# Patient Record
Sex: MALE | Race: WHITE
[De-identification: names, ages, dates, MRNs, and addresses within clinical notes are randomized per-mention and may not be internally consistent; named-entity substitution may affect disease eponyms.]

---

## 2020-03-18 ENCOUNTER — HOSPITAL ENCOUNTER (EMERGENCY)
Dept: HOSPITAL 33 - ED | Age: 70
Discharge: HOME | End: 2020-03-18
Payer: MEDICARE

## 2020-03-18 VITALS — SYSTOLIC BLOOD PRESSURE: 140 MMHG | HEART RATE: 62 BPM | DIASTOLIC BLOOD PRESSURE: 81 MMHG | OXYGEN SATURATION: 97 %

## 2020-03-18 DIAGNOSIS — N12: Primary | ICD-10-CM

## 2020-03-18 LAB
ALBUMIN SERPL-MCNC: 4.2 G/DL (ref 3.5–5)
ALP SERPL-CCNC: 91 U/L (ref 38–126)
ALT SERPL-CCNC: 20 U/L (ref 0–50)
AMYLASE SERPL-CCNC: 63 U/L (ref 30–110)
ANION GAP SERPL CALC-SCNC: 13.6 MEQ/L (ref 5–15)
AST SERPL QL: 29 U/L (ref 17–59)
BASOPHILS # BLD AUTO: 0.01 10*3/UL (ref 0–0.4)
BASOPHILS NFR BLD AUTO: 0.1 % (ref 0–0.4)
BILIRUB BLD-MCNC: 0.5 MG/DL (ref 0.2–1.3)
BUN SERPL-MCNC: 17 MG/DL (ref 9–20)
CALCIUM SPEC-MCNC: 9 MG/DL (ref 8.4–10.2)
CHLORIDE SERPL-SCNC: 106 MMOL/L (ref 98–107)
CO2 SERPL-SCNC: 26 MMOL/L (ref 22–30)
CREAT SERPL-MCNC: 0.97 MG/DL (ref 0.66–1.25)
EOSINOPHIL # BLD AUTO: 0.02 10*3/UL (ref 0–0.5)
FLUAV AG NPH QL IA: NEGATIVE
FLUBV AG NPH QL IA: NEGATIVE
GLUCOSE SERPL-MCNC: 114 MG/DL (ref 74–106)
GLUCOSE UR-MCNC: NEGATIVE MG/DL
HCT VFR BLD AUTO: 47.8 % (ref 42–50)
HGB BLD-MCNC: 16.5 GM/DL (ref 12.5–18)
LIPASE SERPL-CCNC: 24 U/L (ref 23–300)
LYMPHOCYTES # SPEC AUTO: 0.79 10*3/UL (ref 1–4.6)
MCH RBC QN AUTO: 33.2 PG (ref 26–32)
MCHC RBC AUTO-ENTMCNC: 34.5 G/DL (ref 32–36)
MONOCYTES # BLD AUTO: 0.44 10*3/UL (ref 0–1.3)
PLATELET # BLD AUTO: 118 K/MM3 (ref 150–450)
POTASSIUM SERPLBLD-SCNC: 4.2 MMOL/L (ref 3.5–5.1)
PROT SERPL-MCNC: 7.1 G/DL (ref 6.3–8.2)
PROT UR STRIP-MCNC: 30 MG/DL
RBC # BLD AUTO: 4.97 M/MM3 (ref 4.1–5.6)
RBC #/AREA URNS HPF: >101 /HPF (ref 0–2)
RSV AG SPEC QL IA: NEGATIVE
SODIUM SERPL-SCNC: 141 MMOL/L (ref 137–145)
WBC # BLD AUTO: 9.5 K/MM3 (ref 4–10.5)
WBC #/AREA URNS HPF: (no result) /HPF (ref 0–5)

## 2020-03-18 PROCEDURE — 87086 URINE CULTURE/COLONY COUNT: CPT

## 2020-03-18 PROCEDURE — 85025 COMPLETE CBC W/AUTO DIFF WBC: CPT

## 2020-03-18 PROCEDURE — 96374 THER/PROPH/DIAG INJ IV PUSH: CPT

## 2020-03-18 PROCEDURE — 96365 THER/PROPH/DIAG IV INF INIT: CPT

## 2020-03-18 PROCEDURE — 36415 COLL VENOUS BLD VENIPUNCTURE: CPT

## 2020-03-18 PROCEDURE — 82150 ASSAY OF AMYLASE: CPT

## 2020-03-18 PROCEDURE — 83605 ASSAY OF LACTIC ACID: CPT

## 2020-03-18 PROCEDURE — 99284 EMERGENCY DEPT VISIT MOD MDM: CPT

## 2020-03-18 PROCEDURE — 74176 CT ABD & PELVIS W/O CONTRAST: CPT

## 2020-03-18 PROCEDURE — 81001 URINALYSIS AUTO W/SCOPE: CPT

## 2020-03-18 PROCEDURE — 83690 ASSAY OF LIPASE: CPT

## 2020-03-18 PROCEDURE — 80053 COMPREHEN METABOLIC PANEL: CPT

## 2020-03-18 PROCEDURE — 36000 PLACE NEEDLE IN VEIN: CPT

## 2020-03-18 PROCEDURE — 87631 RESP VIRUS 3-5 TARGETS: CPT

## 2020-03-18 PROCEDURE — 96360 HYDRATION IV INFUSION INIT: CPT

## 2020-03-18 NOTE — XRAY
Indication: Right abdomen/flank pain.  Nausea and vomiting.



Multiple contiguous axial images obtained through the abdomen and pelvis

without contrast using renal stone protocol.



Comparison: CTA abdomen/pelvis October 17, 2017.



Lung bases again demonstrates bibasilar dependent atelectasis and scattered

fibrosis/scarring.  No infiltrate or effusion.  Heart is not enlarged.  Stable

small hiatal hernia.



Stable nonobstructing 2-3 mm right lower renal calculus.  No renal calculus or

evidence for obstructive uropathy in either  system.  Inferior right kidney

demonstrates new minimal perinephric stranding probably underlying

inflammation/infection.



Noncontrasted stomach and bowel loops appear nonobstructed.  Normal appendix.

Stable sigmoid diverticulosis and 13 cm splenomegaly.  No free fluid/air.



Remaining liver, gallbladder, angriness, spleen, adrenal glands, kidneys,

ureters, and bladder appear unremarkable for noncontrast exam.  There remains

mild/moderate aortoiliac calcifications including both renal arteries.  No AAA.



Osseous structures again demonstrates mild degenerative changes throughout the

thoracolumbar spine, grade 1-2 L4 spondylolisthesis, and L4-L5 posterior

spinous processes fixation hardware.



Impression:

1.  Stable nonobstructing right renal micro-calculus.

2.  New inferior right renal perinephric stranding.  Rule out underlying

inflammation/infection.

3.  Stable colonic diverticulosis, small hiatal hernia, splenomegaly, and

chronic bony findings.

## 2020-03-18 NOTE — ERPHSYRPT
- History of Present Illness


Time Seen by Provider: 03/18/20 11:30


Historian: patient, family


Exam Limitations: no limitations


Physician History: 





This is a 69-year-old white male who states he has had no prior abdominal 

surgeries and presents with relatively acute onset of right flank pain which is 

radiated into his right lower quadrant area.  He had associated nausea and 

vomiting.  He has had no diarrhea.  He denies chest pain.  He denies shortness 

of breath.  Patient states he is never had anything like this in the past.  He 

does feel as though if he had a bowel movement his symptoms might improve.  He 

describes the pain is somewhat crampy.  The onset of his symptoms occurred a 

few hours ago.


Timing/Duration: today, intermittent, other (Not worse but not letting up either

)


Quality: aching, cramping


Abdominal Pain Onset Location: RUQ, RLQ, flank (Right)


Severity of Pain-Max: moderate


Severity of Pain-Current: mild


Modifying Factors: Improves With: nothing


Associated Symptoms: nausea, vomiting, No chest pain, No fever/chills


Previous symptoms: no prior history


Allergies/Adverse Reactions: 








No Known Drug Allergies Allergy (Unverified 03/18/20 11:34)


 





Home Medications: 








Aspirin 81 gm Chew*** [Baby Aspirin 81 mg Chew***] 81 mg DAILY 03/18/20 [History

]








- Review of Systems


Constitutional: No Symptoms


Eyes: No Symptoms


Ears, Nose, & Throat: No Symptoms


Respiratory: No Symptoms


Cardiac: No Symptoms


Abdominal/Gastrointestinal: Abdominal Pain, Nausea, Vomiting, No Diarrhea


Genitourinary Symptoms: Flank Pain


Musculoskeletal: No Symptoms


Skin: No Symptoms


Neurological: No Symptoms


Psychological: No Symptoms


Endocrine: No Symptoms


Hematologic/Lymphatic: No Symptoms


Immunological/Allergic: No Symptoms


All Other Systems: Reviewed and Negative





- Past Medical History


ENT History: No Pertinent History


Cardiac History: No Pertinent History


Respiratory History: No Pertinent History


Endocrine Medical History: No Pertinent History


Musculoskeletal History: No Pertinent History


GI Medical History: No Pertinent History


 History: No Pertinent History


Psycho-Social History: No Pertinent History


Male Reproductive Disorders: No Pertinent History





- Past Surgical History


Neuro Surgical History: No Pertinent History


Cardiac: No Pertinent History


Respiratory: No Pertinent History


Gastrointestinal: No Pertinent History


Genitourinary: No Pertinent History


Musculoskeletal: No Pertinent History


Male Surgical History: No Pertinent History





- Nursing Vital Signs


Nursing Vital Signs: 


 Initial Vital Signs











Temperature  97.4 F   03/18/20 11:26


 


Pulse Rate  65   03/18/20 11:26


 


Respiratory Rate  16   03/18/20 11:26


 


Blood Pressure  157/84   03/18/20 11:26


 


O2 Sat by Pulse Oximetry  96   03/18/20 11:26








 Pain Scale











Pain Intensity                 6

















- Physical Exam


General Appearance: no apparent distress, alert, anxiety


Eye Exam: PERRL/EOMI, eyes nml inspection


Ears, Nose, Throat Exam: normal ENT inspection, moist mucous membranes


Neck Exam: normal inspection, non-tender, supple, full range of motion


Respiratory Exam: normal breath sounds, lungs clear, airway intact, No chest 

tenderness, No respiratory distress


Cardiovascular Exam: regular rate/rhythm, normal heart sounds, normal 

peripheral pulses


Gastrointestinal/Abdomen Exam: soft, normal bowel sounds, tenderness (Patient 

is not exhibiting any increase in pain with palpation anywhere on his abdomen), 

No guarding, No rebound


Rectal Exam: not done


Back Exam: normal inspection, normal range of motion, CVA tenderness (Mild 

right side), No vertebral tenderness


Extremity Exam: normal inspection, normal range of motion, pelvis stable


Neurologic Exam: alert, oriented x 3, cooperative, CNs II-XII nml as tested, 

normal mood/affect, nml cerebellar function, nml station & gait


Skin Exam: normal color, warm, dry


Lymphatic Exam: No adenopathy


**SpO2 Interpretation**: normal


O2 Delivery: Room Air





- Course


Nursing assessment & vital signs reviewed: Yes


Ordered Tests: 


 Active Orders 24 hr











 Category Date Time Status


 


 IV Insertion STAT Care  03/18/20 11:43 Active


 


 ABDOMEN AND PELVIS W/0 CONTRAS [CT] Stat Exams  03/18/20 11:43 Completed


 


 AMYLASE Stat Lab  03/18/20 12:20 Completed


 


 CBC W DIFF Stat Lab  03/18/20 12:20 Completed


 


 CMP Stat Lab  03/18/20 12:20 Completed


 


 CULTURE,URINE Stat Lab  03/18/20 12:30 Received


 


 LIPASE Stat Lab  03/18/20 12:20 Completed


 


 Lactic Acid Stat Lab  03/18/20 12:20 Received


 


 UA W/RFX UR CULTURE Stat Lab  03/18/20 12:30 Completed








Medication Summary











Generic Name Dose Route Start Last Admin





  Trade Name Freq  PRN Reason Stop Dose Admin


 


Ceftriaxone Sodium/Dextrose  1 g in 50 mls @ 100 mls/hr  03/18/20 13:14  





  Rocephin 1 Gm-D5w 50 Ml Bag**  IV  03/18/20 13:43  





  STAT STA   





     





     





     





     














Discontinued Medications














Generic Name Dose Route Start Last Admin





  Trade Name Marco A  PRN Reason Stop Dose Admin


 


Sodium Chloride  1,000 mls @ 999 mls/hr  03/18/20 11:43  03/18/20 12:05





  Sodium Chloride 0.9% 1000 Ml  IV  03/18/20 12:43  999 mls/hr





  .Q1H1M STA   Administration





     





     





     





     


 


Sodium Chloride  Confirm  03/18/20 12:04  





  Sodium Chloride 0.9% 1000 Ml  Administered  03/18/20 12:05  





  Dose   





  1,000 mls @ ud   





  .ROUTE   





  .STK-MED ONE   





     





     





     





     


 


Levofloxacin  500 mg  03/18/20 13:14  





  Levofloxacin 500 Mg Tablet***  PO  03/18/20 13:15  





  STAT ONE   





     





     





     





     


 


Ondansetron HCl  4 mg  03/18/20 11:43  03/18/20 12:05





  Zofran 4 Mg/2 Ml Vial**  IV  03/18/20 11:44  4 mg





  STAT ONE   Administration





     





     





     





     


 


Ondansetron HCl  Confirm  03/18/20 12:04  





  Zofran 4 Mg/2 Ml Vial**  Administered  03/18/20 12:05  





  Dose   





  4 mg   





  .ROUTE   





  .STK-MED ONE   





     





     





     





     











Lab/Rad Data: 


 Laboratory Result Diagrams





 03/18/20 12:20 





 03/18/20 12:20 





 Laboratory Results











  03/18/20 03/18/20 03/18/20 Range/Units





  12:30 12:20 12:20 


 


WBC    9.5  (4.0-10.5)  K/mm3


 


RBC    4.97  (4.1-5.6)  M/mm3


 


Hgb    16.5  (12.5-18.0)  gm/dl


 


Hct    47.8  (42-50)  %


 


MCV    96.2  ()  fl


 


MCH    33.2 H  (26-32)  pg


 


MCHC    34.5  (32-36)  g/dl


 


RDW    13.7  (11.5-14.0)  %


 


Plt Count    118 L  (150-450)  K/mm3


 


MPV    11.4 H  (7.5-11.0)  fl


 


Gran %    86.8 H  (36.0-66.0)  %


 


Eos # (Auto)    0.02  (0-0.5)  


 


Absolute Lymphs (auto)    0.79 L  (1.0-4.6)  


 


Absolute Monos (auto)    0.44  (0.0-1.3)  


 


Lymphocytes %    8.3 L  (24.0-44.0)  %


 


Monocytes %    4.6  (0.0-12.0)  %


 


Eosinophils %    0.2  (0.00-5.0)  %


 


Basophils %    0.1  (0.0-0.4)  %


 


Absolute Granulocytes    8.28 H  (1.4-6.9)  


 


Basophils #    0.01  (0-0.4)  


 


Sodium   141   (137-145)  mmol/L


 


Potassium   4.2   (3.5-5.1)  mmol/L


 


Chloride   106   ()  mmol/L


 


Carbon Dioxide   26   (22-30)  mmol/L


 


Anion Gap   13.6   (5-15)  MEQ/L


 


BUN   17   (9-20)  mg/dL


 


Creatinine   0.97   (0.66-1.25)  mg/dL


 


Estimated GFR   > 60.0   ML/MIN


 


Glucose   114 H   ()  mg/dL


 


Calcium   9.0   (8.4-10.2)  mg/dL


 


Total Bilirubin   0.50   (0.2-1.3)  mg/dL


 


AST   29   (17-59)  U/L


 


ALT   20   (0-50)  U/L


 


Alkaline Phosphatase   91   ()  U/L


 


Serum Total Protein   7.1   (6.3-8.2)  g/dL


 


Albumin   4.2   (3.5-5.0)  g/dL


 


Amylase   63   ()  U/L


 


Lipase   24   ()  U/L


 


Urine Color  LIZ    (YELLOW)  


 


Urine Appearance  SLIGHTLY CLOUDY    (CLEAR)  


 


Urine pH  5.0    (5-6)  


 


Ur Specific Gravity  1.023    (1.005-1.025)  


 


Urine Protein  30    (Negative)  


 


Urine Ketones  NEGATIVE    (NEGATIVE)  


 


Urine Blood  LARGE    (0-5)  Shay/ul


 


Urine Nitrite  NEGATIVE    (NEGATIVE)  


 


Urine Bilirubin  NEGATIVE    (NEGATIVE)  


 


Urine Urobilinogen  NEGATIVE    (0-1)  mg/dL


 


Ur Leukocyte Esterase  NEGATIVE    (NEGATIVE)  


 


Urine WBC (Auto)  6-10    (0-5)  /HPF


 


Urine RBC (Auto)  >101    (0-2)  /HPF


 


U Epithel Cells (Auto)  NONE    (FEW)  /HPF


 


Urine Bacteria (Auto)  RARE    (NEGATIVE)  /HPF


 


Urine Mucus (Auto)  MANY    (NEGATIVE)  /HPF


 


Urine Culture Reflexed  YES    (NO)  


 


Urine Glucose  NEGATIVE    (NEGATIVE)  mg/dL














- Progress


Progress Note: 





03/18/20 13:16


CAT scan of the abdomen and pelvis reveals right nephrolithiasis.  There is 

also some inflammation of the inferior pole of the right kidney.  There is some 

inflammatory stranding present as well.  No ureterolithiasis.  No other acute 

findings


Counseled pt/family regarding: lab results, diagnosis, need for follow-up, rad 

results





- Departure


Departure Disposition: Home


Clinical Impression: 


 Pyelonephritis





Condition: Stable


Critical Care Time: No


Referrals: 


ALCIDES BROWN [Primary Care Provider] - 


Additional Instructions: 


Drink plenty of fluids.  Follow-up with your primary care physician for further 

management.  Return to the emergency room if your symptoms worsen.


Prescriptions: 


Levofloxacin*** [Levaquin 500 MG Tablet***] 500 mg PO DAILY #10 tablet

## 2021-12-21 ENCOUNTER — HOSPITAL ENCOUNTER (OUTPATIENT)
Dept: HOSPITAL 33 - SDC | Age: 71
Discharge: HOME | End: 2021-12-21
Attending: OPHTHALMOLOGY
Payer: MEDICARE

## 2021-12-21 VITALS — HEART RATE: 68 BPM | OXYGEN SATURATION: 95 %

## 2021-12-21 VITALS — SYSTOLIC BLOOD PRESSURE: 137 MMHG | DIASTOLIC BLOOD PRESSURE: 75 MMHG

## 2021-12-21 DIAGNOSIS — H25.812: Primary | ICD-10-CM

## 2021-12-21 PROCEDURE — 99100 ANES PT EXTEME AGE<1 YR&>70: CPT

## 2022-04-04 ENCOUNTER — HOSPITAL ENCOUNTER (EMERGENCY)
Dept: HOSPITAL 33 - ED | Age: 72
Discharge: HOME | End: 2022-04-04
Payer: MEDICARE

## 2022-04-04 VITALS — DIASTOLIC BLOOD PRESSURE: 90 MMHG | SYSTOLIC BLOOD PRESSURE: 171 MMHG | HEART RATE: 70 BPM

## 2022-04-04 VITALS — OXYGEN SATURATION: 96 %

## 2022-04-04 DIAGNOSIS — K08.89: ICD-10-CM

## 2022-04-04 DIAGNOSIS — Z72.0: ICD-10-CM

## 2022-04-04 DIAGNOSIS — K04.7: Primary | ICD-10-CM

## 2022-04-04 PROCEDURE — 99283 EMERGENCY DEPT VISIT LOW MDM: CPT

## 2022-04-04 PROCEDURE — 96372 THER/PROPH/DIAG INJ SC/IM: CPT

## 2022-04-04 NOTE — ERPHSYRPT
- History of Present Illness


Time Seen by Provider: 04/04/22 18:50


Source: patient


Exam Limitations: no limitations


Patient Subjective Stated Complaint: Toothache


Triage Nursing Assessment: Patient ambulated back to ED and transferred self to 

bed. Patient A+O X3. Patient's skin pink, warm and dry. Patient complains of 

toothache to right upper back tooth 6/10 intermittent sharp pain. Patient had 

recent dental work to area and attempted to call dentist today but they never 

called back.


Physician History: 


Patient is a 72-year-old male presents to our ED with complaints of dental pain.

 Patient recently had dental work to tooth #2.  Patient states shortly 

thereafter he developed tenderness at the adjacent gingiva.  Pain is gotten 

progressively worse.  Pain described as an ache that is well localized.  No 

radiation.  Otherwise no trauma.  Mastication reproduces symptoms.  Pain 

improved with rest.  Patient has not taken any analgesics for his pain.  No 

headache.  No fever.  No nausea or vomiting.  Patient voices no other complaints

or concerns at this time.





Timing/Duration: today


Severity: moderate


Modifying Factors: Improves With: other (Mastication)


Associated Symptoms: denies symptoms


Allergies/Adverse Reactions: 








No Known Drug Allergies Allergy (Verified 04/04/22 18:45)


   





Hx Influenza Vaccination/Date Given: Yes


Hx Pneumococcal Vaccination/Date Given: Yes


Immunizations Up to Date: Yes





Travel Risk





- International Travel


Have you traveled outside of the country in past 3 weeks: No





- Coronavirus Screening


Are you exhibiting any of the following symptoms?: No





- Vaccine Status


Have you recieved a Covid-19 vaccination: Yes


: Moderna





- Vaccination Dates


Date of 2cond Vaccination (if applicable): 03/06/2021





- Review of Systems


Constitutional: No Symptoms, No Fever, No Chills


Eyes: No Symptoms


Ears, Nose, & Throat: No Symptoms


Respiratory: No Symptoms, No Cough, No Dyspnea


Cardiac: No Symptoms, No Chest Pain, No Edema, No Syncope


Abdominal/Gastrointestinal: No Symptoms, No Abdominal Pain, No Nausea, No 

Vomiting, No Diarrhea


Genitourinary Symptoms: No Symptoms, No Dysuria


Musculoskeletal: No Symptoms, No Back Pain, No Neck Pain


Skin: No Symptoms, No Rash


Neurological: No Symptoms, No Dizziness, No Focal Weakness, No Sensory Changes


Psychological: No Symptoms


Endocrine: No Symptoms


Hematologic/Lymphatic: No Symptoms


Immunological/Allergic: No Symptoms


All Other Systems: Reviewed and Negative





- Past Medical History


Pertinent Past Medical History: No


ENT History: Cataracts, Macular Degeneration


Cardiac History: No Pertinent History


Respiratory History: No Pertinent History


Endocrine Medical History: No Pertinent History


Musculoskeletal History: No Pertinent History


GI Medical History: No Pertinent History


 History: No Pertinent History


Psycho-Social History: No Pertinent History


Male Reproductive Disorders: No Pertinent History





- Past Surgical History


Past Surgical History: Yes


Neuro Surgical History: No Pertinent History


Cardiac: No Pertinent History


Respiratory: No Pertinent History


Gastrointestinal: No Pertinent History


Genitourinary: No Pertinent History


Musculoskeletal: Orthopedic Surgery


Male Surgical History: No Pertinent History


Other Surgical History: back surgery





- Social History


Smoking Status: Current every day smoker


How long have you smoked: years


Exposure to second hand smoke: Yes


Drug Use: none


Patient Lives Alone: No





- Nursing Vital Signs


Nursing Vital Signs: 


                               Initial Vital Signs











Temperature  97.2 F   04/04/22 18:46


 


Pulse Rate  76   04/04/22 18:46


 


Respiratory Rate  18   04/04/22 18:46


 


Blood Pressure  162/84   04/04/22 18:46


 


O2 Sat by Pulse Oximetry  96   04/04/22 18:46








                                   Pain Scale











Pain Intensity                 6

















- Physical Exam


General Appearance: no apparent distress, alert


Eye Exam: PERRL/EOMI, eyes nml inspection


Ears, Nose, Throat Exam: normal ENT inspection, TMs normal, pharynx normal, 

moist mucous membranes, other (There is swelling and tenderness to the gingiva 

adjacent to tooth #2.  There appears to be an early abscess formation.  

Percussion to tooth reproduces symptoms.  No signs of Dante's angina.  

Oropharyngeal exam otherwise nonremarkable.)


Neck Exam: normal inspection, non-tender, supple, full range of motion


Respiratory Exam: normal breath sounds, lungs clear, airway intact, No 

respiratory distress


Cardiovascular Exam: regular rate/rhythm, normal heart sounds, normal peripheral

pulses


Gastrointestinal/Abdomen Exam: soft, normal bowel sounds, No tenderness, No mass


Back Exam: normal inspection, normal range of motion, No CVA tenderness, No 

vertebral tenderness


Extremity Exam: normal inspection, normal range of motion, pelvis stable


Neurologic Exam: alert, oriented x 3, cooperative, normal mood/affect, nml 

cerebellar function, nml station & gait, sensation nml, No motor deficits


Skin Exam: normal color, warm, dry, No rash


Lymphatic Exam: No adenopathy


**SpO2 Interpretation**: normal


SpO2: 96


O2 Delivery: Room Air





- Course


Nursing assessment & vital signs reviewed: Yes


Ordered Tests: 


Medication Summary














Discontinued Medications














Generic Name Dose Route Start Last Admin





  Trade Name Marco A  PRN Reason Stop Dose Admin


 


Amoxicillin/Clavulanate Potassium  875 mg  04/04/22 19:08  04/04/22 19:15





  Amox Tr/Potassium Clavulanate 875 Mg Tablet  PO  04/04/22 19:09  875 mg





  STAT ONE   Administration


 


Amoxicillin/Clavulanate Potassium  Confirm  04/04/22 19:14 





  Amox Tr/Potassium Clavulanate 875 Mg Tablet  Administered  04/04/22 19:15 





  Dose  





  875 mg  





  .ROUTE  





  .STK-MED ONE  


 


Ketorolac Tromethamine  30 mg  04/04/22 19:09  04/04/22 19:15





  Ketorolac Tromethamine 30 Mg/Ml Inj  IM  04/04/22 19:10  30 mg





  STAT ONE   Administration


 


Ketorolac Tromethamine  Confirm  04/04/22 19:14 





  Ketorolac Tromethamine 30 Mg/Ml Inj  Administered  04/04/22 19:15 





  Dose  





  30 mg  





  .ROUTE  





  .STK-MED ONE  














- Progress


Progress: improved


Progress Note: 


Patient received Toradol for pain.  Pain improved.  Patient received a dose of 

Augmentin in our ED for dental abscess formation.  A prescription for Augmentin 

was forwarded to patient's pharmacy.  A written prescription for Toradol was 

given to patient.  Patient agrees to follow-up with his dentist within 48 hours 

for evaluation.





Portions of this note were created with voice recognition technology.  There may

be grammatical, spelling, punctuation or sound alike errors


04/04/22 20:53





Counseled pt/family regarding: diagnosis, need for follow-up





- Departure


Departure Disposition: Home


Clinical Impression: 


 Dental abscess, Pain, dental





Condition: Stable


Critical Care Time: No


Referrals: 


ALCIDES BROWN MD [Primary Care Provider] - Follow up/PCP as directed


Instructions:  Dental Pain (DC)


Additional Instructions: 


Discharge/Care Plan





ANNIA AGUIAR was seen on 04/04/22 in the Emergency Room. The patient was 

counseled regarding Diagnosis,Lab results, Imaging studies, need for follow up 

and when to return to the Emergency Room.





Prescriptions given:





Discharge Note





I have spoken with the patient and/or caregivers. I have explained the patient's

condition, diagnosis and treatment plan based on the information available to me

at this time. I have answered the patient's and/or caregiver's questions and a

ddressed any concerns. The patient and/or caregivers have as good understanding 

of the patient's diagnosis, condition and treatment plan as can be expected at 

this point. The vital signs have been stable. The patient's condition is stable 

and appropriate for discharge from the emergency department.





The patient will pursue further outpatient evaluation with the primary care 

physician or other designated or consulting physician as outlined in the 

discharge instructions. The patient and/or caregivers are agreeable to this plan

of care and follow-up instructions have been explained in detail. The patient 

and/or caregivers have received these instruction. The patient/and or caregivers

are aware that any significant change in condition or worsening of symptoms 

should prompt an immediate return to this or the closest emergency department or

call 911. 








Prescriptions: 


Amox Tr/Potass Clav. 875 mg*** [Augmentin 875-125 Tablet***] 875 mg PO BID 7 

Days #14 tablet